# Patient Record
Sex: MALE | Race: BLACK OR AFRICAN AMERICAN | Employment: STUDENT | URBAN - METROPOLITAN AREA
[De-identification: names, ages, dates, MRNs, and addresses within clinical notes are randomized per-mention and may not be internally consistent; named-entity substitution may affect disease eponyms.]

---
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
## 2022-12-29 ENCOUNTER — HOSPITAL ENCOUNTER (OUTPATIENT)
Age: 22
Discharge: HOME | End: 2022-12-29
Attending: EMERGENCY MEDICINE
Payer: OTHER GOVERNMENT

## 2022-12-29 VITALS
WEIGHT: 225 LBS | HEART RATE: 73 BPM | BODY MASS INDEX: 34.1 KG/M2 | RESPIRATION RATE: 18 BRPM | OXYGEN SATURATION: 99 % | HEIGHT: 68 IN | DIASTOLIC BLOOD PRESSURE: 78 MMHG | SYSTOLIC BLOOD PRESSURE: 129 MMHG | TEMPERATURE: 98 F

## 2022-12-29 DIAGNOSIS — H66.005 RECURRENT ACUTE SUPPURATIVE OTITIS MEDIA WITHOUT SPONTANEOUS RUPTURE OF LEFT TYMPANIC MEMBRANE: Primary | ICD-10-CM

## 2022-12-29 PROCEDURE — 99203 OFFICE O/P NEW LOW 30 MIN: CPT

## 2022-12-29 PROCEDURE — 99204 OFFICE O/P NEW MOD 45 MIN: CPT

## 2022-12-29 RX ORDER — AMOXICILLIN AND CLAVULANATE POTASSIUM 875; 125 MG/1; MG/1
1 TABLET, FILM COATED ORAL 2 TIMES DAILY
Qty: 20 TABLET | Refills: 0 | Status: SHIPPED | OUTPATIENT
Start: 2022-12-29 | End: 2023-01-08

## 2022-12-29 NOTE — DISCHARGE INSTRUCTIONS
Acetaminophen/ibuprofen as needed. Antibiotics as prescribed. Return if worsening symptoms or new complaints.

## (undated) NOTE — LETTER
Date & Time: 3/15/2019, 2:54 PM  Patient: Aaron Estrella  Encounter Provider(s):    Kendrick Goff MD       To Whom It May Concern:    Bunny Stein was seen and treated in our department on 3/15/2019.  He should not return to work until Monday, Aliyah Harrell

## (undated) NOTE — ED AVS SNAPSHOT
Karen Block   MRN: [id number]    Department:  [facility] Emergency Department   Date of Visit:  3/15/2019           Disclosure     Insurance plans vary and the physician(s) referred by the ER may not be covered by your plan.  Please contact you tell this physician (or your personal doctor if your instructions are to return to your personal doctor) about any new or lasting problems. The primary care or specialist physician will see patients referred from the [facility] Emergency Department.  Jay Carlin